# Patient Record
Sex: MALE | Race: WHITE | NOT HISPANIC OR LATINO | Employment: UNEMPLOYED | URBAN - METROPOLITAN AREA
[De-identification: names, ages, dates, MRNs, and addresses within clinical notes are randomized per-mention and may not be internally consistent; named-entity substitution may affect disease eponyms.]

---

## 2017-02-24 ENCOUNTER — CLINICAL SUPPORT (OUTPATIENT)
Dept: REHABILITATION | Facility: HOSPITAL | Age: 47
End: 2017-02-24
Attending: GENERAL PRACTICE
Payer: MEDICAID

## 2017-02-24 DIAGNOSIS — G89.29 CHRONIC MIDLINE LOW BACK PAIN WITHOUT SCIATICA: ICD-10-CM

## 2017-02-24 DIAGNOSIS — R29.3 POOR POSTURE: ICD-10-CM

## 2017-02-24 DIAGNOSIS — M62.89 MUSCLE TIGHTNESS: ICD-10-CM

## 2017-02-24 DIAGNOSIS — M54.50 CHRONIC MIDLINE LOW BACK PAIN WITHOUT SCIATICA: ICD-10-CM

## 2017-02-24 DIAGNOSIS — M62.81 WEAKNESS OF TRUNK MUSCULATURE: ICD-10-CM

## 2017-02-24 PROCEDURE — 97162 PT EVAL MOD COMPLEX 30 MIN: CPT | Mod: PN
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
## 2017-03-03 ENCOUNTER — CLINICAL SUPPORT (OUTPATIENT)
Dept: REHABILITATION | Facility: HOSPITAL | Age: 47
End: 2017-03-03
Attending: GENERAL PRACTICE
Payer: MEDICAID

## 2017-03-03 DIAGNOSIS — M62.89 MUSCLE TIGHTNESS: ICD-10-CM

## 2017-03-03 DIAGNOSIS — M54.50 CHRONIC MIDLINE LOW BACK PAIN WITHOUT SCIATICA: Primary | ICD-10-CM

## 2017-03-03 DIAGNOSIS — G89.29 CHRONIC MIDLINE LOW BACK PAIN WITHOUT SCIATICA: Primary | ICD-10-CM

## 2017-03-03 DIAGNOSIS — M62.81 WEAKNESS OF TRUNK MUSCULATURE: ICD-10-CM

## 2017-03-03 DIAGNOSIS — R29.3 POOR POSTURE: ICD-10-CM

## 2017-03-03 PROCEDURE — 97110 THERAPEUTIC EXERCISES: CPT | Mod: PN

## 2017-03-03 NOTE — PROGRESS NOTES
Name: Derian Allred [facility] Number: [id number]  Date of Treatment: 03/03/2017   Diagnosis:   Encounter Diagnoses   Name Primary?    Chronic midline low back pain without sciatica Yes    Weakness of trunk musculature     Muscle tightness     Poor posture        Time in: 0800  Time Out: 0855  Total Treatment Time: 55'        Subjective:    Derian reports improvement of symptoms.  Patient reports their pain to be 0/10 on a 0-10 scale with 0 being no pain and 10 being the worst pain imaginable. Pt cc is stiffness secondary to the weather change.     Objective    Patient received individual therapy to increase strength, endurance, ROM, flexibility, posture and core stabilization with activities as follows:     Derian received therapeutic exercises to develop strength, endurance, ROM, flexibility, posture and core stabilization for 55 minutes including:   NuStep x10 minutes warm up  DKTC x3 minutes   LTR x3 minutes  HL bridging 2x10  HL PPT 2x10 5 second holds  HL hip adduction ball squeeze 3x10 3 second holds  SL clamshells 2x10 ea.   Supine piriformis stretch 2x3 seconds ea. LE  Supine hamstring stretch 2x30 seconds ea. LE  Supine hip flexor stretch 2x30 seconds ea. LE  Standing mini squats 2x10  Standing hip abduction 2x10 ea. LE    Written Home Exercises Provided: reviewed from IE  Pt demo good understanding of the education provided. Derian demonstrated fair return demonstration of activities.     Assessment:   Derian tolerated treatment session well this morning. Pt requires cueing for correct performance of all exercises with fair correction. Pt with heavy anterior weight shift with mini squats and required continues cueing to drive weight through heels to active appropriate gluteal musculature. Plan to continue with core strengthening and improving flexibility in the following sessions as tolerated.   Pt will continue to benefit from skilled PT intervention. Medical Necessity is demonstrated by:   Pain limits function of effected part for some activities, Unable to participate fully in daily activities, Requires skilled supervision to complete and progress HEP and Weakness.    Patient is making fair progress towards established goals.    Short Term Goals (4 Weeks):   1. Pt will report 20% reduced pain within the lumbar spine for ease with walking   2. Pt will demonstrate 1/3 improvement MMT in BLE   4. Pt will demonstrate static standing balance on BLE for 30 seconds without obvious instability or use of UE assistance  5. Pt will demonstrate improved lumbar ROM by 25% in all directions for ease with picking an object up from the floor  Long Term Goals (8 Weeks):   1.Pt will report <5/10 pain within the lumbar spine for ease with ADL's  2. Pt will demonstrate 50% improvement of hamstring and hip flexor length in BLE for ease with ambulation  3. Pt will be independent with HEP for maintenance of improvements gained in therapy sessions   4. Pt will demonstrate 4+/5 strength or greater in BLE for ease with running errands            Plan      Certification Period: 2/24/17 to 5/24/17  Recommended Treatment Plan: 1-2 times per week for 8 weeks: Gait Training, Manual Therapy, Moist Heat/ Ice, Neuromuscular Re-ed, Patient Education and Therapeutic Exercise  Other Recommendations: none    Continue with established Plan of Care towards PT goals.

## 2017-03-10 ENCOUNTER — CLINICAL SUPPORT (OUTPATIENT)
Dept: REHABILITATION | Facility: HOSPITAL | Age: 47
End: 2017-03-10
Attending: GENERAL PRACTICE
Payer: MEDICAID

## 2017-03-10 DIAGNOSIS — G89.29 CHRONIC MIDLINE LOW BACK PAIN WITHOUT SCIATICA: Primary | ICD-10-CM

## 2017-03-10 DIAGNOSIS — M47.9 SPONDYLOSIS OF UNSPECIFIED SITE WITHOUT MENTION OF MYELOPATHY: ICD-10-CM

## 2017-03-10 DIAGNOSIS — M62.81 WEAKNESS OF TRUNK MUSCULATURE: ICD-10-CM

## 2017-03-10 DIAGNOSIS — M54.50 CHRONIC MIDLINE LOW BACK PAIN WITHOUT SCIATICA: Primary | ICD-10-CM

## 2017-03-10 DIAGNOSIS — R29.3 POOR POSTURE: ICD-10-CM

## 2017-03-10 DIAGNOSIS — M62.89 MUSCLE TIGHTNESS: ICD-10-CM

## 2017-03-10 PROCEDURE — 97110 THERAPEUTIC EXERCISES: CPT | Mod: PN

## 2017-03-10 NOTE — PROGRESS NOTES
Name: Derian Allred [facility] Number: [id number]  Date of Treatment: 03/10/2017   Diagnosis:   Encounter Diagnoses   Name Primary?    Chronic midline low back pain without sciatica Yes    Weakness of trunk musculature     Muscle tightness     Poor posture     Spondylosis of unspecified site without mention of myelopathy        Time in: 0930  Time Out: 1020  Total Treatment Time:50''        Subjective:    Derian reports improvement of symptoms.  Patient reports their pain to be 0/10 on a 0-10 scale with 0 being no pain and 10 being the worst pain imaginable. Pt cc is stiffness in the lower back.     Objective    Patient received individual therapy to increase strength, endurance, ROM, flexibility, posture and core stabilization with activities as follows:     Derian received therapeutic exercises to develop strength, endurance, ROM, flexibility, posture and core stabilization for 55 minutes including:   NuStep x10 minutes warm up  DKTC x3 minutes   LTR x3 minutes  HL bridging 3x10  HL PPT 2x10 5 second holds  HL TrA 3 second holds x3 minutes   HL hip adduction ball squeeze 3x10 3 second holds  SL clamshells 2x10 ea.   Supine piriformis stretch 2x30 seconds ea. LE  Supine hamstring stretch 2x30 seconds ea. LE  Supine hip flexor stretch 2x30 seconds ea. LE  Standing mini squats 2x10  Standing hip abduction 2x10 ea. LE    Written Home Exercises Provided: reviewed from IE  Pt demo good understanding of the education provided. Derian demonstrated fair return demonstration of activities.     Assessment:   Derian tolerated treatment session well this morning. Pt requires cueing for correct performance of all exercises with fair correction. With heavy rectus abdominus activation, requiring heavy tactile and verbal cueing for appropriate transverse abdominus activation.  Plan to continue with core strengthening and improving flexibility in the following sessions as tolerated.   Pt will continue to benefit from  skilled PT intervention. Medical Necessity is demonstrated by:  Pain limits function of effected part for some activities, Unable to participate fully in daily activities, Requires skilled supervision to complete and progress HEP and Weakness.    Patient is making fair progress towards established goals.    Short Term Goals (4 Weeks):   1. Pt will report 20% reduced pain within the lumbar spine for ease with walking   2. Pt will demonstrate 1/3 improvement MMT in BLE   4. Pt will demonstrate static standing balance on BLE for 30 seconds without obvious instability or use of UE assistance  5. Pt will demonstrate improved lumbar ROM by 25% in all directions for ease with picking an object up from the floor  Long Term Goals (8 Weeks):   1.Pt will report <5/10 pain within the lumbar spine for ease with ADL's  2. Pt will demonstrate 50% improvement of hamstring and hip flexor length in BLE for ease with ambulation  3. Pt will be independent with HEP for maintenance of improvements gained in therapy sessions   4. Pt will demonstrate 4+/5 strength or greater in BLE for ease with running errands            Plan      Certification Period: 2/24/17 to 5/24/17  Recommended Treatment Plan: 1-2 times per week for 8 weeks: Gait Training, Manual Therapy, Moist Heat/ Ice, Neuromuscular Re-ed, Patient Education and Therapeutic Exercise  Other Recommendations: none    Continue with established Plan of Care towards PT goals.

## 2017-03-30 ENCOUNTER — CLINICAL SUPPORT (OUTPATIENT)
Dept: REHABILITATION | Facility: HOSPITAL | Age: 47
End: 2017-03-30
Attending: GENERAL PRACTICE
Payer: MEDICAID

## 2017-03-30 DIAGNOSIS — R29.3 POOR POSTURE: ICD-10-CM

## 2017-03-30 DIAGNOSIS — M62.81 WEAKNESS OF TRUNK MUSCULATURE: Primary | ICD-10-CM

## 2017-03-30 DIAGNOSIS — M62.89 MUSCLE TIGHTNESS: ICD-10-CM

## 2017-03-30 DIAGNOSIS — M54.50 CHRONIC MIDLINE LOW BACK PAIN WITHOUT SCIATICA: ICD-10-CM

## 2017-03-30 DIAGNOSIS — G89.29 CHRONIC MIDLINE LOW BACK PAIN WITHOUT SCIATICA: ICD-10-CM

## 2017-03-30 PROCEDURE — 97110 THERAPEUTIC EXERCISES: CPT | Mod: PN

## 2017-03-30 NOTE — PROGRESS NOTES
Name: Derian Allred [facility] Number: [id number]  Date of Treatment: 03/30/2017   Diagnosis:   Encounter Diagnoses   Name Primary?    Weakness of trunk musculature Yes    Chronic midline low back pain without sciatica     Muscle tightness     Poor posture        Time in: 1000  Time Out: 1055  Total Treatment Time: 55'        Subjective:    Derian reports improvement of symptoms with cc of stiffness.  Patient reports their pain to be 0/10 on a 0-10 scale with 0 being no pain and 10 being the worst pain imaginable. Pt cc is stiffness in the lower back.     Objective    Patient received individual therapy to increase strength, endurance, ROM, flexibility, posture and core stabilization with activities as follows:     Derian received therapeutic exercises to develop strength, endurance, ROM, flexibility, posture and core stabilization for 55 minutes including:   NuStep x10 minutes warm up  DKTC x3 minutes   LTR x3 minutes  HL bridging 3x10  HL PPT x3 minutes 5 second holds  HL TrA 3 second holds x3 minutes   HL hip adduction ball squeeze 3x10 3 second holds  SL hip abduction 2x10  Supine piriformis stretch 2x30 seconds ea. LE  Supine hamstring stretch 2x30 seconds ea. LE  Supine hip flexor stretch 2x30 seconds ea. LE  Standing mini squats 2x10  Standing hip abduction 2x10 ea. LE  Shuttle leg press    Written Home Exercises Provided: reviewed from IE  Pt demo good understanding of the education provided. Derian demonstrated fair return demonstration of activities.     Assessment:   Derian tolerated treatment session well this morning. Pt requires cueing for correct performance of all exercises with fair correction. Pt with provocation of muscle cramps in the RLE throughout treatment session requiring multiple rest breaks to allow for rest and stretching. Will perform re-assessment at next treatment session.  Plan to continue with core strengthening and improving flexibility in the following sessions as  tolerated.   Pt will continue to benefit from skilled PT intervention. Medical Necessity is demonstrated by:  Pain limits function of effected part for some activities, Unable to participate fully in daily activities, Requires skilled supervision to complete and progress HEP and Weakness.    Patient is making fair progress towards established goals.    Short Term Goals (4 Weeks):   1. Pt will report 20% reduced pain within the lumbar spine for ease with walking   2. Pt will demonstrate 1/3 improvement MMT in BLE   4. Pt will demonstrate static standing balance on BLE for 30 seconds without obvious instability or use of UE assistance  5. Pt will demonstrate improved lumbar ROM by 25% in all directions for ease with picking an object up from the floor  Long Term Goals (8 Weeks):   1.Pt will report <5/10 pain within the lumbar spine for ease with ADL's  2. Pt will demonstrate 50% improvement of hamstring and hip flexor length in BLE for ease with ambulation  3. Pt will be independent with HEP for maintenance of improvements gained in therapy sessions   4. Pt will demonstrate 4+/5 strength or greater in BLE for ease with running errands            Plan      Certification Period: 2/24/17 to 5/24/17  Recommended Treatment Plan: 1-2 times per week for 8 weeks: Gait Training, Manual Therapy, Moist Heat/ Ice, Neuromuscular Re-ed, Patient Education and Therapeutic Exercise  Other Recommendations: none    Continue with established Plan of Care towards PT goals.

## 2017-04-18 ENCOUNTER — CLINICAL SUPPORT (OUTPATIENT)
Dept: REHABILITATION | Facility: HOSPITAL | Age: 47
End: 2017-04-18
Attending: GENERAL PRACTICE
Payer: MEDICAID

## 2017-04-18 DIAGNOSIS — G89.29 CHRONIC MIDLINE LOW BACK PAIN WITHOUT SCIATICA: ICD-10-CM

## 2017-04-18 DIAGNOSIS — M62.81 WEAKNESS OF TRUNK MUSCULATURE: Primary | ICD-10-CM

## 2017-04-18 DIAGNOSIS — R29.3 POOR POSTURE: ICD-10-CM

## 2017-04-18 DIAGNOSIS — M62.89 MUSCLE TIGHTNESS: ICD-10-CM

## 2017-04-18 DIAGNOSIS — M54.50 CHRONIC MIDLINE LOW BACK PAIN WITHOUT SCIATICA: ICD-10-CM

## 2017-04-18 PROCEDURE — 97110 THERAPEUTIC EXERCISES: CPT | Mod: PN

## 2017-04-18 NOTE — PROGRESS NOTES
Name: Derian Allred [facility] Number: [id number]  Date of Treatment: 04/18/2017   Diagnosis:   Encounter Diagnoses   Name Primary?    Weakness of trunk musculature Yes    Chronic midline low back pain without sciatica     Muscle tightness     Poor posture        Time in: 0807  Time Out: 0900  Total Treatment Time: 53'        Subjective:    Derian reports improvement of symptoms without reports of pain.  Patient reports their pain to be 0/10 on a 0-10 scale with 0 being no pain and 10 being the worst pain imaginable. Pt states he had a little bit of pain by the end of the day yesterday after he had been walking a lot.     Objective    AROM:    FB : fingertips to 6 inches above floor with stretching in lower back   BB: 50% limitation with increased pain in lower back    SBR: fingertips to tibiofemoral joint line with tightness    SBL : fingertips to tibiofemoral joint line with tightness    RotL : 25% limitation without increased pain    Rot R: 25% limitation without increased pain    STR/myotome:   R  L   hip flex    4-  4    knee ext    4-  4-    ankle DF    4-  4-    great toe ext    4  4    ankle PF    4-  4-      knee flex   3+  3+  FOTO: 71% limitation    Patient received individual therapy to increase strength, endurance, ROM, flexibility, posture and core stabilization with activities as follows:     Derian received therapeutic exercises to develop strength, endurance, ROM, flexibility, posture and core stabilization for 53 minutes including:   NuStep x10 minutes hills program level 1  DKTC x3 minutes   LTR x3 minutes  HL bridging 3x10  HL PPT x3 minutes 5 second holds  HL TrA 3 second holds x3 minutes   SL hip adduction 2x10 ea  SL hip abduction 2x10  Sciatic nerve glide (supine   Supine piriformis stretch 2x30 seconds ea. LE  Supine hamstring stretch 2x30 seconds ea. LE  Supine hip flexor stretch 2x30 seconds ea. LE  Standing mini squats 2x10  Standing hip abduction 2x10 ea. LE  Shuttle leg  press    Written Home Exercises Provided: reviewed from IE  Pt demo good understanding of the education provided. Derian demonstrated fair return demonstration of activities.     Assessment:   Derian tolerated treatment session well this morning. Pt requires cueing for correct performance of all exercises with fair correction. Pt continues with provocation of paresthesia within the RLE after 20 minutes of exercise. Monthly assessment performed today. Pt with mild improvement in lumbar spine ROM with decreased provocation of pain. Pt continues with weakness throughout BLE (RLE greater limitation than L). Pt was instructed to schedule an appointment with referring Md for further evaluation of the RLE, as RLE has not changed in symptomology from beginning of skilled care. Pt has achieved majority of his short term goals, with the exception of full 1/3 MMT improvement in BLE, likely secondary to fair attendance to therapy sessions.    Plan to continue with core strengthening and improving flexibility in the following sessions as tolerated.   Pt will continue to benefit from skilled PT intervention. Medical Necessity is demonstrated by:  Pain limits function of effected part for some activities, Unable to participate fully in daily activities, Requires skilled supervision to complete and progress HEP and Weakness.    Patient is making fair progress towards established goals.    Short Term Goals (4 Weeks):   1. Pt will report 20% reduced pain within the lumbar spine for ease with walking  met  2. Pt will demonstrate 1/3 improvement MMT in BLE  In progress  4. Pt will demonstrate static standing balance on BLE for 30 seconds without obvious instability or use of UE assistance met  5. Pt will demonstrate improved lumbar ROM by 25% in all directions for ease with picking an object up from the floor in progress  Long Term Goals (8 Weeks):   1.Pt will report <5/10 pain within the lumbar spine for ease with ADL's  2. Pt will  demonstrate 50% improvement of hamstring and hip flexor length in BLE for ease with ambulation  3. Pt will be independent with HEP for maintenance of improvements gained in therapy sessions   4. Pt will demonstrate 4+/5 strength or greater in BLE for ease with running errands            Plan      Certification Period: 2/24/17 to 5/24/17  Recommended Treatment Plan: 1-2 times per week for 8 weeks: Gait Training, Manual Therapy, Moist Heat/ Ice, Neuromuscular Re-ed, Patient Education and Therapeutic Exercise  Other Recommendations: none    Continue with established Plan of Care towards PT goals.

## 2017-04-25 ENCOUNTER — DOCUMENTATION ONLY (OUTPATIENT)
Dept: REHABILITATION | Facility: HOSPITAL | Age: 47
End: 2017-04-25

## 2017-04-25 NOTE — PROGRESS NOTES
Mr. De called the clinic this morning. Pt reporting he went to see his doctor per our discussion at last treatment session. Pt MD requesting to hold off on continuation of PT until further evaluation can be completed on his RLE. Per pt and pt MD request, pt is discharged from skilled PT services.     Sade Medellin, PT  4/25/2017

## 2017-05-12 ENCOUNTER — HOSPITAL ENCOUNTER (OUTPATIENT)
Dept: RADIOLOGY | Facility: HOSPITAL | Age: 47
Discharge: HOME | End: 2017-05-12
Attending: GENERAL PRACTICE
Payer: MEDICAID

## 2017-05-12 DIAGNOSIS — M23.91 DERANGEMENT OF RIGHT KNEE: ICD-10-CM

## 2017-05-12 PROCEDURE — 73721 MRI JNT OF LWR EXTRE W/O DYE: CPT | Mod: 26,RT,, | Performed by: RADIOLOGY

## 2017-05-12 PROCEDURE — 73721 MRI JNT OF LWR EXTRE W/O DYE: CPT | Mod: TC,RT

## 2018-03-28 DIAGNOSIS — M70.21 OLECRANON BURSITIS, RIGHT ELBOW: Primary | ICD-10-CM

## 2020-01-27 ENCOUNTER — HOSPITAL ENCOUNTER (EMERGENCY)
Facility: HOSPITAL | Age: 50
Discharge: HOME | End: 2020-01-27
Attending: EMERGENCY MEDICINE
Payer: MEDICAID

## 2020-01-27 VITALS
SYSTOLIC BLOOD PRESSURE: 148 MMHG | TEMPERATURE: 98 F | BODY MASS INDEX: 28.95 KG/M2 | RESPIRATION RATE: 16 BRPM | WEIGHT: 191 LBS | HEIGHT: 68 IN | HEART RATE: 88 BPM | OXYGEN SATURATION: 100 % | DIASTOLIC BLOOD PRESSURE: 66 MMHG

## 2020-01-27 DIAGNOSIS — R07.9 CHEST PAIN: ICD-10-CM

## 2020-01-27 DIAGNOSIS — R03.0 ELEVATED BLOOD PRESSURE READING WITHOUT DIAGNOSIS OF HYPERTENSION: ICD-10-CM

## 2020-01-27 DIAGNOSIS — R07.89 ATYPICAL CHEST PAIN: Primary | ICD-10-CM

## 2020-01-27 LAB
ALBUMIN SERPL BCP-MCNC: 4 G/DL (ref 3.5–5.2)
ALP SERPL-CCNC: 108 U/L (ref 55–135)
ALT SERPL W/O P-5'-P-CCNC: 51 U/L (ref 10–44)
AMPHET+METHAMPHET UR QL: NEGATIVE
ANION GAP SERPL CALC-SCNC: 8 MMOL/L (ref 8–16)
AST SERPL-CCNC: 27 U/L (ref 10–40)
BARBITURATES UR QL SCN>200 NG/ML: NEGATIVE
BASOPHILS # BLD AUTO: 0.03 K/UL (ref 0–0.2)
BASOPHILS NFR BLD: 0.6 % (ref 0–1.9)
BENZODIAZ UR QL SCN>200 NG/ML: NEGATIVE
BILIRUB SERPL-MCNC: 0.3 MG/DL (ref 0.1–1)
BUN SERPL-MCNC: 15 MG/DL (ref 6–20)
BZE UR QL SCN: NEGATIVE
CALCIUM SERPL-MCNC: 9.4 MG/DL (ref 8.7–10.5)
CANNABINOIDS UR QL SCN: NORMAL
CHLORIDE SERPL-SCNC: 103 MMOL/L (ref 95–110)
CO2 SERPL-SCNC: 27 MMOL/L (ref 23–29)
CREAT SERPL-MCNC: 1.2 MG/DL (ref 0.5–1.4)
CREAT UR-MCNC: 94 MG/DL (ref 23–375)
DIFFERENTIAL METHOD: ABNORMAL
EOSINOPHIL # BLD AUTO: 0.1 K/UL (ref 0–0.5)
EOSINOPHIL NFR BLD: 1.5 % (ref 0–8)
ERYTHROCYTE [DISTWIDTH] IN BLOOD BY AUTOMATED COUNT: 12.6 % (ref 11.5–14.5)
EST. GFR  (AFRICAN AMERICAN): >60 ML/MIN/1.73 M^2
EST. GFR  (NON AFRICAN AMERICAN): >60 ML/MIN/1.73 M^2
ETHANOL SERPL-MCNC: <10 MG/DL
GLUCOSE SERPL-MCNC: 138 MG/DL (ref 70–110)
HCT VFR BLD AUTO: 43.3 % (ref 40–54)
HGB BLD-MCNC: 14.9 G/DL (ref 14–18)
IMM GRANULOCYTES # BLD AUTO: 0.03 K/UL (ref 0–0.04)
IMM GRANULOCYTES NFR BLD AUTO: 0.6 % (ref 0–0.5)
LYMPHOCYTES # BLD AUTO: 1.4 K/UL (ref 1–4.8)
LYMPHOCYTES NFR BLD: 27.1 % (ref 18–48)
MCH RBC QN AUTO: 30.5 PG (ref 27–31)
MCHC RBC AUTO-ENTMCNC: 34.4 G/DL (ref 32–36)
MCV RBC AUTO: 89 FL (ref 82–98)
METHADONE UR QL SCN>300 NG/ML: NEGATIVE
MONOCYTES # BLD AUTO: 0.4 K/UL (ref 0.3–1)
MONOCYTES NFR BLD: 6.8 % (ref 4–15)
NEUTROPHILS # BLD AUTO: 3.3 K/UL (ref 1.8–7.7)
NEUTROPHILS NFR BLD: 63.4 % (ref 38–73)
NRBC BLD-RTO: 0 /100 WBC
OPIATES UR QL SCN: NEGATIVE
PCP UR QL SCN>25 NG/ML: NEGATIVE
PLATELET # BLD AUTO: 169 K/UL (ref 150–350)
PMV BLD AUTO: 10.6 FL (ref 9.2–12.9)
POTASSIUM SERPL-SCNC: 4.1 MMOL/L (ref 3.5–5.1)
PROT SERPL-MCNC: 7.3 G/DL (ref 6–8.4)
RBC # BLD AUTO: 4.89 M/UL (ref 4.6–6.2)
SODIUM SERPL-SCNC: 138 MMOL/L (ref 136–145)
TOXICOLOGY INFORMATION: NORMAL
TROPONIN I SERPL DL<=0.01 NG/ML-MCNC: 0.01 NG/ML (ref 0–0.03)
WBC # BLD AUTO: 5.27 K/UL (ref 3.9–12.7)

## 2020-01-27 PROCEDURE — 93010 EKG 12-LEAD: ICD-10-PCS | Mod: ,,, | Performed by: INTERNAL MEDICINE

## 2020-01-27 PROCEDURE — 93010 ELECTROCARDIOGRAM REPORT: CPT | Mod: ,,, | Performed by: INTERNAL MEDICINE

## 2020-01-27 PROCEDURE — 80307 DRUG TEST PRSMV CHEM ANLYZR: CPT

## 2020-01-27 PROCEDURE — 94640 AIRWAY INHALATION TREATMENT: CPT

## 2020-01-27 PROCEDURE — 93005 ELECTROCARDIOGRAM TRACING: CPT

## 2020-01-27 PROCEDURE — 84484 ASSAY OF TROPONIN QUANT: CPT

## 2020-01-27 PROCEDURE — 63600175 PHARM REV CODE 636 W HCPCS: Performed by: EMERGENCY MEDICINE

## 2020-01-27 PROCEDURE — 25000242 PHARM REV CODE 250 ALT 637 W/ HCPCS: Performed by: EMERGENCY MEDICINE

## 2020-01-27 PROCEDURE — 85025 COMPLETE CBC W/AUTO DIFF WBC: CPT

## 2020-01-27 PROCEDURE — 80320 DRUG SCREEN QUANTALCOHOLS: CPT

## 2020-01-27 PROCEDURE — 80053 COMPREHEN METABOLIC PANEL: CPT

## 2020-01-27 PROCEDURE — 25000003 PHARM REV CODE 250: Performed by: EMERGENCY MEDICINE

## 2020-01-27 PROCEDURE — 99285 EMERGENCY DEPT VISIT HI MDM: CPT | Mod: 25

## 2020-01-27 RX ORDER — IPRATROPIUM BROMIDE AND ALBUTEROL SULFATE 2.5; .5 MG/3ML; MG/3ML
3 SOLUTION RESPIRATORY (INHALATION)
Status: DISPENSED | OUTPATIENT
Start: 2020-01-27 | End: 2020-01-27

## 2020-01-27 RX ORDER — FAMOTIDINE 20 MG/1
20 TABLET, FILM COATED ORAL 2 TIMES DAILY
Qty: 60 TABLET | Refills: 0 | Status: SHIPPED | OUTPATIENT
Start: 2020-01-27 | End: 2024-03-27

## 2020-01-27 RX ORDER — DICYCLOMINE HYDROCHLORIDE 20 MG/1
20 TABLET ORAL 2 TIMES DAILY
Qty: 30 TABLET | Refills: 0 | Status: SHIPPED | OUTPATIENT
Start: 2020-01-27 | End: 2020-02-26

## 2020-01-27 RX ORDER — NITROGLYCERIN 0.4 MG/1
0.4 TABLET SUBLINGUAL
Status: COMPLETED | OUTPATIENT
Start: 2020-01-27 | End: 2020-01-27

## 2020-01-27 RX ORDER — ASPIRIN 325 MG
325 TABLET, DELAYED RELEASE (ENTERIC COATED) ORAL DAILY
Status: DISCONTINUED | OUTPATIENT
Start: 2020-01-27 | End: 2020-01-27 | Stop reason: HOSPADM

## 2020-01-27 RX ORDER — SIMETHICONE 125 MG
125 CAPSULE ORAL 4 TIMES DAILY PRN
Qty: 30 CAPSULE | Refills: 0 | Status: SHIPPED | OUTPATIENT
Start: 2020-01-27

## 2020-01-27 RX ADMIN — ASPIRIN 325 MG: 325 TABLET, DELAYED RELEASE ORAL at 01:01

## 2020-01-27 RX ADMIN — IPRATROPIUM BROMIDE AND ALBUTEROL SULFATE 3 ML: .5; 3 SOLUTION RESPIRATORY (INHALATION) at 01:01

## 2020-01-27 RX ADMIN — NITROGLYCERIN 0.4 MG: 0.4 TABLET SUBLINGUAL at 01:01

## 2020-01-27 NOTE — ED PROVIDER NOTES
Encounter Date: 1/27/2020       History     Chief Complaint   Patient presents with    Chest Pain     here via EMS with c/o chest pain, worsened with movements and coughing. Denies any cardiac hx, reports hx of anxiety.     49 y.o. male Past Medical History:  No date: Anxiety  No date: Depression  No date: Emphysema of lung     Presents c/o chest tightness starting at 7am. Notes it radiates down L arm. Denies f/c, n/v, diarrhea/dysuria/cough.  States it does not feel like his anxiety.         Review of patient's allergies indicates:  No Known Allergies  Past Medical History:   Diagnosis Date    Anxiety     Depression     Emphysema of lung      No past surgical history on file.  No family history on file.  Social History     Tobacco Use    Smoking status: Current Every Day Smoker     Packs/day: 1.00     Types: Cigarettes   Substance Use Topics    Alcohol use: Not on file    Drug use: Not on file     Review of Systems   Constitutional: Negative for fever.   HENT: Negative for sore throat.    Respiratory: Negative for shortness of breath.    Cardiovascular: Positive for chest pain.   Gastrointestinal: Negative for nausea.   Genitourinary: Negative for dysuria.   Musculoskeletal: Negative for back pain.   Skin: Negative for rash.   Neurological: Negative for weakness.   Hematological: Does not bruise/bleed easily.   All other systems reviewed and are negative.      Physical Exam     Initial Vitals [01/27/20 1254]   BP Pulse Resp Temp SpO2   (!) 151/68 90 20 98.3 °F (36.8 °C) 100 %      MAP       --         Physical Exam    Nursing note and vitals reviewed.  Constitutional: He appears well-developed and well-nourished.   HENT:   Head: Normocephalic and atraumatic.   Eyes: EOM are normal. Pupils are equal, round, and reactive to light.   Cardiovascular: Normal rate, regular rhythm and normal pulses.   Pulmonary/Chest: Effort normal and breath sounds normal.   Abdominal: He exhibits no distension.   Musculoskeletal:  He exhibits no edema or tenderness.   Neurological: He is alert and oriented to person, place, and time. No cranial nerve deficit.   Skin: Skin is warm and dry.   Psychiatric: He has a normal mood and affect.         ED Course   Procedures  Labs Reviewed   CBC W/ AUTO DIFFERENTIAL   COMPREHENSIVE METABOLIC PANEL   TROPONIN I     EKG Readings: (Independently Interpreted)   Hr 88, sinus, nl axis/intervals, no phil/twi, non acute, no stemi.       Imaging Results    None             Additional MDM:   Heart Score:    History:          Slightly suspicious.  ECG:             Normal  Age:               45-65 years  Risk factors: no risk factors known  Troponin:       Less than or equal to normal limit  Final Score: 1                       Pt has no risk factors. Will do 1 set of c.e. Given onset 7am.     Labs Reviewed   CBC W/ AUTO DIFFERENTIAL - Abnormal; Notable for the following components:       Result Value    Immature Granulocytes 0.6 (*)     All other components within normal limits   COMPREHENSIVE METABOLIC PANEL - Abnormal; Notable for the following components:    Glucose 138 (*)     ALT 51 (*)     All other components within normal limits   TROPONIN I   ALCOHOL,MEDICAL (ETHANOL)   DRUG SCREEN PANEL, URINE EMERGENCY       X-Ray Chest AP Portable   Final Result      Please see above         Electronically signed by: Yury Campbell DO   Date:    01/27/2020   Time:    13:27              Clinical Impression:       ICD-10-CM ICD-9-CM   1. Atypical chest pain R07.89 786.59   2. Chest pain R07.9 786.50   3. Elevated blood pressure reading without diagnosis of hypertension R03.0 796.2                             Ellyn Cedeno MD  01/27/20 0466

## 2020-01-27 NOTE — DISCHARGE INSTRUCTIONS
Thank you for coming to our Emergency Department today. It is important to remember that some problems are difficult to diagnose and may not be found during your first visit. Be sure to follow up with your primary care doctor and review any labs/imaging that was performed with them. If you do not have a primary care doctor, you may contact the one listed on your discharge paperwork or you may also call the [facility] Appointment Desk at [phone] to schedule an appointment with one.     All medications may potentially have side effects and it is impossible to predict which medications may give you side effects. If you feel that you are having a negative effect of any medication you should immediately stop taking them and seek medical attention.    Return to the ER with any questions/concerns, new/concerning symptoms, worsening or failure to improve. Do not drive or make any important decisions for 24 hours if you have received any pain medications, sedatives or mood altering drugs during your ER visit.

## 2020-01-27 NOTE — ED TRIAGE NOTES
Pt presents to ED from home with c/o right and left CP that started at 0730 today. Pt reports pain 8/10 (pressure) that radiates down left arm. Pt also complains of SOB and left arm tingling. Pt denies taking any medication for pain or aspirin/cardiac before arrival. Pt A&O x4.

## 2021-04-15 ENCOUNTER — PATIENT MESSAGE (OUTPATIENT)
Dept: RESEARCH | Facility: HOSPITAL | Age: 51
End: 2021-04-15

## 2021-07-01 ENCOUNTER — PATIENT MESSAGE (OUTPATIENT)
Dept: ADMINISTRATIVE | Facility: OTHER | Age: 51
End: 2021-07-01

## 2021-10-05 ENCOUNTER — HOSPITAL ENCOUNTER (EMERGENCY)
Facility: HOSPITAL | Age: 51
Discharge: HOME | End: 2021-10-05
Attending: EMERGENCY MEDICINE
Payer: MEDICAID

## 2021-10-05 VITALS
HEIGHT: 66 IN | SYSTOLIC BLOOD PRESSURE: 127 MMHG | TEMPERATURE: 98 F | HEART RATE: 65 BPM | RESPIRATION RATE: 14 BRPM | WEIGHT: 190 LBS | DIASTOLIC BLOOD PRESSURE: 77 MMHG | OXYGEN SATURATION: 99 % | BODY MASS INDEX: 30.53 KG/M2

## 2021-10-05 DIAGNOSIS — G56.32 RADIAL NERVE PALSY, LEFT: Primary | ICD-10-CM

## 2021-10-05 DIAGNOSIS — M21.339: ICD-10-CM

## 2021-10-05 LAB — POCT GLUCOSE: 108 MG/DL (ref 70–110)

## 2021-10-05 PROCEDURE — 99284 EMERGENCY DEPT VISIT MOD MDM: CPT | Mod: 25

## 2021-10-05 PROCEDURE — 82962 GLUCOSE BLOOD TEST: CPT

## 2021-10-05 RX ORDER — IBUPROFEN 800 MG/1
800 TABLET ORAL EVERY 6 HOURS PRN
Qty: 20 TABLET | Refills: 0 | Status: SHIPPED | OUTPATIENT
Start: 2021-10-05

## 2021-10-28 ENCOUNTER — TELEPHONE (OUTPATIENT)
Dept: NEUROLOGY | Facility: CLINIC | Age: 51
End: 2021-10-28
Payer: MEDICAID

## 2021-10-28 DIAGNOSIS — G56.32 LESION OF LEFT RADIAL NERVE: Primary | ICD-10-CM

## 2021-12-03 ENCOUNTER — PROCEDURE VISIT (OUTPATIENT)
Dept: NEUROLOGY | Facility: CLINIC | Age: 51
End: 2021-12-03
Payer: MEDICAID

## 2021-12-03 VITALS — BODY MASS INDEX: 30.54 KG/M2 | WEIGHT: 190.06 LBS | HEIGHT: 66 IN

## 2021-12-03 DIAGNOSIS — G56.32 LESION OF LEFT RADIAL NERVE: Primary | ICD-10-CM

## 2021-12-03 PROCEDURE — 95911 NRV CNDJ TEST 9-10 STUDIES: CPT | Mod: 26,S$PBB,, | Performed by: NEUROLOGICAL SURGERY

## 2021-12-03 PROCEDURE — 95886 PR EMG COMPLETE, W/ NERVE CONDUCTION STUDIES, 5+ MUSCLES: ICD-10-PCS | Mod: 26,S$PBB,, | Performed by: NEUROLOGICAL SURGERY

## 2021-12-03 PROCEDURE — 95886 MUSC TEST DONE W/N TEST COMP: CPT | Mod: 26,S$PBB,, | Performed by: NEUROLOGICAL SURGERY

## 2021-12-03 PROCEDURE — 95886 MUSC TEST DONE W/N TEST COMP: CPT | Mod: PBBFAC | Performed by: NEUROLOGICAL SURGERY

## 2021-12-03 PROCEDURE — 95911 PR NERVE CONDUCTION STUDY; 9-10 STUDIES: ICD-10-PCS | Mod: 26,S$PBB,, | Performed by: NEUROLOGICAL SURGERY

## 2021-12-03 PROCEDURE — 95911 NRV CNDJ TEST 9-10 STUDIES: CPT | Mod: PBBFAC | Performed by: NEUROLOGICAL SURGERY

## 2023-11-09 DIAGNOSIS — R55 SYNCOPE AND COLLAPSE: Primary | ICD-10-CM

## 2023-11-14 ENCOUNTER — HOSPITAL ENCOUNTER (OUTPATIENT)
Dept: RADIOLOGY | Facility: HOSPITAL | Age: 53
Discharge: HOME | End: 2023-11-14
Attending: GENERAL PRACTICE
Payer: MEDICAID

## 2023-11-14 DIAGNOSIS — R55 SYNCOPE AND COLLAPSE: ICD-10-CM

## 2023-11-14 PROCEDURE — 93880 US CAROTID BILATERAL: ICD-10-PCS | Mod: 26,,, | Performed by: RADIOLOGY

## 2023-11-14 PROCEDURE — 93880 EXTRACRANIAL BILAT STUDY: CPT | Mod: TC

## 2023-11-14 PROCEDURE — 93880 EXTRACRANIAL BILAT STUDY: CPT | Mod: 26,,, | Performed by: RADIOLOGY

## 2023-12-12 ENCOUNTER — HOSPITAL ENCOUNTER (OUTPATIENT)
Dept: CARDIOLOGY | Facility: HOSPITAL | Age: 53
Discharge: HOME | End: 2023-12-12
Attending: GENERAL PRACTICE
Payer: MEDICAID

## 2023-12-12 DIAGNOSIS — R55 SYNCOPE AND COLLAPSE: ICD-10-CM

## 2023-12-12 LAB
AV INDEX (PROSTH): 0.93
AV MEAN GRADIENT: 4 MMHG
AV PEAK GRADIENT: 5 MMHG
AV VALVE AREA BY VELOCITY RATIO: 2.55 CM²
AV VALVE AREA: 2.99 CM²
AV VELOCITY RATIO: 0.79
CV ECHO LV RWT: 0.55 CM
DOP CALC AO PEAK VEL: 1.17 M/S
DOP CALC AO VTI: 21.3 CM
DOP CALC LVOT AREA: 3.2 CM2
DOP CALC LVOT DIAMETER: 2.02 CM
DOP CALC LVOT PEAK VEL: 0.93 M/S
DOP CALC LVOT STROKE VOLUME: 63.74 CM3
DOP CALCLVOT PEAK VEL VTI: 19.9 CM
E WAVE DECELERATION TIME: 151.91 MSEC
E/A RATIO: 1.51
E/E' RATIO: 9.81 M/S
ECHO LV POSTERIOR WALL: 1.16 CM (ref 0.6–1.1)
FRACTIONAL SHORTENING: 36 % (ref 28–44)
INTERVENTRICULAR SEPTUM: 1.23 CM (ref 0.6–1.1)
IVC DIAMETER: 1.46 CM
IVRT: 88.49 MSEC
LA MAJOR: 5.21 CM
LA MINOR: 4.92 CM
LA WIDTH: 4 CM
LEFT ATRIUM SIZE: 3.98 CM
LEFT ATRIUM VOLUME: 68.48 CM3
LEFT INTERNAL DIMENSION IN SYSTOLE: 2.72 CM (ref 2.1–4)
LEFT VENTRICLE DIASTOLIC VOLUME: 80.14 ML
LEFT VENTRICLE SYSTOLIC VOLUME: 27.55 ML
LEFT VENTRICULAR INTERNAL DIMENSION IN DIASTOLE: 4.24 CM (ref 3.5–6)
LEFT VENTRICULAR MASS: 179.65 G
LV LATERAL E/E' RATIO: 9.36 M/S
LV SEPTAL E/E' RATIO: 10.3 M/S
LVOT MG: 2.07 MMHG
LVOT MV: 0.68 CM/S
MV PEAK A VEL: 0.68 M/S
MV PEAK E VEL: 1.03 M/S
MV STENOSIS PRESSURE HALF TIME: 44.05 MS
MV VALVE AREA P 1/2 METHOD: 4.99 CM2
PISA TR MAX VEL: 1.76 M/S
PULM VEIN S/D RATIO: 1.43
PV PEAK D VEL: 0.37 M/S
PV PEAK GRADIENT: 3 MMHG
PV PEAK S VEL: 0.53 M/S
PV PEAK VELOCITY: 0.92 M/S
RA MAJOR: 4.84 CM
RA PRESSURE ESTIMATED: 3 MMHG
RA WIDTH: 3.6 CM
RIGHT VENTRICULAR END-DIASTOLIC DIMENSION: 3.36 CM
RV TB RVSP: 5 MMHG
RV TISSUE DOPPLER FREE WALL SYSTOLIC VELOCITY 1 (APICAL 4 CHAMBER VIEW): 12.47 CM/S
SINUS: 3.36 CM
STJ: 1.85 CM
TDI LATERAL: 0.11 M/S
TDI SEPTAL: 0.1 M/S
TDI: 0.11 M/S
TR MAX PG: 12 MMHG
TRICUSPID ANNULAR PLANE SYSTOLIC EXCURSION: 2.04 CM
TV REST PULMONARY ARTERY PRESSURE: 15 MMHG

## 2023-12-12 PROCEDURE — 93306 TTE W/DOPPLER COMPLETE: CPT | Mod: 26,,, | Performed by: INTERNAL MEDICINE

## 2023-12-12 PROCEDURE — 93306 ECHO (CUPID ONLY): ICD-10-PCS | Mod: 26,,, | Performed by: INTERNAL MEDICINE

## 2023-12-12 PROCEDURE — 93306 TTE W/DOPPLER COMPLETE: CPT

## 2024-01-10 ENCOUNTER — OFFICE VISIT (OUTPATIENT)
Dept: CARDIOLOGY | Facility: CLINIC | Age: 54
End: 2024-01-10
Payer: MEDICAID

## 2024-01-10 VITALS
HEIGHT: 67 IN | WEIGHT: 192 LBS | HEART RATE: 69 BPM | BODY MASS INDEX: 30.13 KG/M2 | SYSTOLIC BLOOD PRESSURE: 139 MMHG | OXYGEN SATURATION: 98 % | DIASTOLIC BLOOD PRESSURE: 76 MMHG

## 2024-01-10 DIAGNOSIS — I65.22 ASYMPTOMATIC STENOSIS OF LEFT CAROTID ARTERY: ICD-10-CM

## 2024-01-10 DIAGNOSIS — R29.898 WEAKNESS OF BOTH UPPER EXTREMITIES: Primary | ICD-10-CM

## 2024-01-10 DIAGNOSIS — I65.29 OCCLUSION AND STENOSIS OF UNSPECIFIED CAROTID ARTERY: Primary | ICD-10-CM

## 2024-01-10 PROCEDURE — 3008F BODY MASS INDEX DOCD: CPT | Mod: CPTII,,, | Performed by: INTERNAL MEDICINE

## 2024-01-10 PROCEDURE — 99204 OFFICE O/P NEW MOD 45 MIN: CPT | Mod: S$PBB,,, | Performed by: INTERNAL MEDICINE

## 2024-01-10 PROCEDURE — 99213 OFFICE O/P EST LOW 20 MIN: CPT | Mod: PBBFAC | Performed by: INTERNAL MEDICINE

## 2024-01-10 PROCEDURE — 99999 PR PBB SHADOW E&M-EST. PATIENT-LVL III: CPT | Mod: PBBFAC,,, | Performed by: INTERNAL MEDICINE

## 2024-01-10 PROCEDURE — 1159F MED LIST DOCD IN RCRD: CPT | Mod: CPTII,,, | Performed by: INTERNAL MEDICINE

## 2024-01-10 PROCEDURE — 3075F SYST BP GE 130 - 139MM HG: CPT | Mod: CPTII,,, | Performed by: INTERNAL MEDICINE

## 2024-01-10 PROCEDURE — 3078F DIAST BP <80 MM HG: CPT | Mod: CPTII,,, | Performed by: INTERNAL MEDICINE

## 2024-01-10 PROCEDURE — 1160F RVW MEDS BY RX/DR IN RCRD: CPT | Mod: CPTII,,, | Performed by: INTERNAL MEDICINE

## 2024-01-10 NOTE — PROGRESS NOTES
PCP - Garrett Singh III, MD  Subjective:   Patient ID:  Derian Cartwright is a 53 y.o. male who presents for  evaluation of carotid stenosis     HPI: 54 yo male. Presented with complaint of falling and weakness in his upper extremities. He reports sudden loss in strength in lower extremities and falling, usually not accompanied with dizziness or loss of conscious, also he reports feeling dizzy and falling when he looks up, reporting dropping things from his hands and weakness in the hands with pain   US reported severe L ICA stenosis and moderate R ICA stenosis     History:     Past Medical History:   Diagnosis Date    Anxiety     Depression     Emphysema of lung     Peripheral neuropathy      History reviewed. No pertinent surgical history.  Social History     Tobacco Use    Smoking status: Some Days     Current packs/day: 0.50     Types: Cigarettes    Smokeless tobacco: Not on file    Tobacco comments:     jewel pods   Substance Use Topics    Alcohol use: Not Currently     History reviewed. No pertinent family history.    Meds:   Review of patient's allergies indicates:  No Known Allergies    Current Outpatient Medications:     atorvastatin calcium (ATORVASTATIN ORAL), Take by mouth., Disp: , Rfl:     famotidine (PEPCID) 20 MG tablet, Take 1 tablet (20 mg total) by mouth 2 (two) times daily., Disp: 60 tablet, Rfl: 0    ibuprofen (ADVIL,MOTRIN) 800 MG tablet, Take 1 tablet (800 mg total) by mouth every 6 (six) hours as needed for Pain., Disp: 20 tablet, Rfl: 0    pregabalin (LYRICA) 75 MG capsule, Take 75 mg by mouth 2 (two) times daily., Disp: , Rfl:     quetiapine (SEROQUEL XR) 300 MG Tb24, Take by mouth once daily., Disp: , Rfl:     sertraline (ZOLOFT) 100 MG tablet, Take 100 mg by mouth once daily., Disp: , Rfl:     simethicone (MYLICON) 125 mg Cap capsule, Take 1 capsule (125 mg total) by mouth 4 (four) times daily as needed. (Patient not taking: Reported on 1/10/2024), Disp: 30 capsule, Rfl:  "0      ROS 14 systems were reviewed, negative except above     Objective:   /76 (BP Location: Right arm, Patient Position: Sitting, BP Method: Large (Automatic))   Pulse 69   Ht 5' 7" (1.702 m)   Wt 87.1 kg (192 lb 0.3 oz)   SpO2 98%   BMI 30.07 kg/m²   Physical Exam  Gen awake and alert  Neck supple, R carotid bruits   Heat distant heart sounds, no appreciated M  Lungs CTA B  Abdomen soft non tender   Ext no edema   Neuro  strength is 4/5   Labs:     Lab Results   Component Value Date     01/27/2020    K 4.1 01/27/2020     01/27/2020    CO2 27 01/27/2020    BUN 15 01/27/2020    CREATININE 1.2 01/27/2020    ANIONGAP 8 01/27/2020     No results found for: "HGBA1C"  No results found for: "BNP", "BNPTRIAGEBLO"    Lab Results   Component Value Date    WBC 5.27 01/27/2020    HGB 14.9 01/27/2020    HCT 43.3 01/27/2020     01/27/2020    GRAN 3.3 01/27/2020    GRAN 63.4 01/27/2020     No results found for: "CHOL", "HDL", "LDLCALC", "TRIG"    Lab Results   Component Value Date     01/27/2020    K 4.1 01/27/2020     01/27/2020    CO2 27 01/27/2020    BUN 15 01/27/2020    CREATININE 1.2 01/27/2020    ANIONGAP 8 01/27/2020     No results found for: "HGBA1C"  No results found for: "BNP", "BNPTRIAGEBLO" Lab Results   Component Value Date    WBC 5.27 01/27/2020    HGB 14.9 01/27/2020    HCT 43.3 01/27/2020     01/27/2020    GRAN 3.3 01/27/2020    GRAN 63.4 01/27/2020     No results found for: "CHOL", "HDL", "LDLCALC", "TRIG"             Cardiovascular Imaging:   Carotid US     TECHNIQUE:  Grayscale and color Doppler ultrasound examination of the carotid and vertebral artery systems bilaterally.  Stenosis estimates are per the NASCET measurement criteria.     COMPARISON:  None     FINDINGS:  Right:     CCA PSV: 139-cm/sec     ICA PSV: 140-cm/sec     ICA EDV: 24-cm/sec     ECA PSV: 124-cm/sec     ICA/CCA PSV ratio: 1.0     Plaque formation: Homogeneous     Vertebral artery: " Antegrade flow with normal monophasic waveforms.     Left:     CCA PSV: 186-cm/sec     ICA PSV: 306-cm/sec     ICA EDV: 101-cm/sec     ECA PSV: 119-cm/sec     ICA/CCA PSV ratio: 1.6     Plaque formation: Homogeneous     Vertebral artery: Antegrade flow with normal monophasic waveforms.     Impression:     1. Sonogram suggest moderate (50-69%) stenosis along the proximal right ICA and, severe (70-69%) stenosis along the proximal left ICA.  This report was flagged in Epic as abnormal.    Assessment & Plan:   Weakness of both upper extremities  Concerning for cervical spine stenosis, proceed with cervical MRI and referral to spine clinic     Asymptomatic stenosis of left carotid artery  Continue Aspirin and high intensity statin   Proceed with CTA to better evaluate carotid stenosis           Signed:  Tony Stevenson MD  Interventional fellow

## 2024-01-10 NOTE — ASSESSMENT & PLAN NOTE
Continue Aspirin and high intensity statin   Proceed with CTA to better evaluate carotid stenosis

## 2024-01-11 DIAGNOSIS — Z01.818 PREOP TESTING: ICD-10-CM

## 2024-01-11 DIAGNOSIS — I65.22 ASYMPTOMATIC STENOSIS OF LEFT CAROTID ARTERY: Primary | ICD-10-CM

## 2024-01-12 ENCOUNTER — TELEPHONE (OUTPATIENT)
Dept: CARDIOLOGY | Facility: CLINIC | Age: 54
End: 2024-01-12
Payer: MEDICAID

## 2024-01-12 NOTE — TELEPHONE ENCOUNTER
Contact with patient to notify of labs needed prior to his CT scan currently booked.  Pt verbalized understanding however unsure where his appointments were.  Offered to mail appointment letters to patient, patient accepted.  Pt labs ordered and appt made for patient as well.  Mailed appointment letters to patient as requested.

## 2024-01-19 ENCOUNTER — HOSPITAL ENCOUNTER (OUTPATIENT)
Dept: RADIOLOGY | Facility: HOSPITAL | Age: 54
Discharge: HOME | End: 2024-01-19
Attending: INTERNAL MEDICINE
Payer: MEDICAID

## 2024-01-19 DIAGNOSIS — I65.29 OCCLUSION AND STENOSIS OF UNSPECIFIED CAROTID ARTERY: ICD-10-CM

## 2024-01-19 PROCEDURE — A9585 GADOBUTROL INJECTION: HCPCS | Performed by: INTERNAL MEDICINE

## 2024-01-19 PROCEDURE — 72156 MRI NECK SPINE W/O & W/DYE: CPT | Mod: TC

## 2024-01-19 PROCEDURE — 25500020 PHARM REV CODE 255: Performed by: INTERNAL MEDICINE

## 2024-01-19 PROCEDURE — 72156 MRI NECK SPINE W/O & W/DYE: CPT | Mod: 26,,, | Performed by: RADIOLOGY

## 2024-01-19 RX ORDER — GADOBUTROL 604.72 MG/ML
9 INJECTION INTRAVENOUS
Status: COMPLETED | OUTPATIENT
Start: 2024-01-19 | End: 2024-01-19

## 2024-01-19 RX ADMIN — GADOBUTROL 9 ML: 604.72 INJECTION INTRAVENOUS at 10:01

## 2024-01-24 ENCOUNTER — HOSPITAL ENCOUNTER (OUTPATIENT)
Dept: RADIOLOGY | Facility: HOSPITAL | Age: 54
Discharge: HOME | End: 2024-01-24
Attending: INTERNAL MEDICINE
Payer: MEDICAID

## 2024-01-24 DIAGNOSIS — I65.29 OCCLUSION AND STENOSIS OF UNSPECIFIED CAROTID ARTERY: ICD-10-CM

## 2024-01-24 PROCEDURE — 70496 CT ANGIOGRAPHY HEAD: CPT | Mod: 26,,, | Performed by: RADIOLOGY

## 2024-01-24 PROCEDURE — 70498 CT ANGIOGRAPHY NECK: CPT | Mod: 26,,, | Performed by: RADIOLOGY

## 2024-01-24 PROCEDURE — 25500020 PHARM REV CODE 255: Performed by: INTERNAL MEDICINE

## 2024-01-24 PROCEDURE — 70496 CT ANGIOGRAPHY HEAD: CPT | Mod: TC

## 2024-01-24 RX ADMIN — IOHEXOL 75 ML: 350 INJECTION, SOLUTION INTRAVENOUS at 04:01

## 2024-03-27 ENCOUNTER — OFFICE VISIT (OUTPATIENT)
Dept: CARDIOLOGY | Facility: CLINIC | Age: 54
End: 2024-03-27
Payer: MEDICAID

## 2024-03-27 ENCOUNTER — EDUCATION (OUTPATIENT)
Dept: CARDIOLOGY | Facility: CLINIC | Age: 54
End: 2024-03-27
Payer: MEDICAID

## 2024-03-27 VITALS
WEIGHT: 202.38 LBS | DIASTOLIC BLOOD PRESSURE: 58 MMHG | SYSTOLIC BLOOD PRESSURE: 109 MMHG | BODY MASS INDEX: 30.67 KG/M2 | HEART RATE: 83 BPM | HEIGHT: 68 IN | OXYGEN SATURATION: 98 %

## 2024-03-27 DIAGNOSIS — I65.23 BILATERAL CAROTID ARTERY STENOSIS: Primary | ICD-10-CM

## 2024-03-27 DIAGNOSIS — Z01.818 PREOP TESTING: Primary | ICD-10-CM

## 2024-03-27 DIAGNOSIS — I65.22 CAROTID STENOSIS, SYMPTOMATIC W/O INFARCT, LEFT: Primary | ICD-10-CM

## 2024-03-27 PROBLEM — I65.29 CAROTID ARTERY STENOSIS: Status: ACTIVE | Noted: 2024-01-10

## 2024-03-27 PROCEDURE — 3074F SYST BP LT 130 MM HG: CPT | Mod: CPTII,,, | Performed by: INTERNAL MEDICINE

## 2024-03-27 PROCEDURE — 99999 PR PBB SHADOW E&M-EST. PATIENT-LVL III: CPT | Mod: PBBFAC,,, | Performed by: INTERNAL MEDICINE

## 2024-03-27 PROCEDURE — 3078F DIAST BP <80 MM HG: CPT | Mod: CPTII,,, | Performed by: INTERNAL MEDICINE

## 2024-03-27 PROCEDURE — 99215 OFFICE O/P EST HI 40 MIN: CPT | Mod: S$PBB,,, | Performed by: INTERNAL MEDICINE

## 2024-03-27 PROCEDURE — 1159F MED LIST DOCD IN RCRD: CPT | Mod: CPTII,,, | Performed by: INTERNAL MEDICINE

## 2024-03-27 PROCEDURE — 3008F BODY MASS INDEX DOCD: CPT | Mod: CPTII,,, | Performed by: INTERNAL MEDICINE

## 2024-03-27 PROCEDURE — 99213 OFFICE O/P EST LOW 20 MIN: CPT | Mod: PBBFAC | Performed by: INTERNAL MEDICINE

## 2024-03-27 RX ORDER — SODIUM CHLORIDE 9 MG/ML
INJECTION, SOLUTION INTRAVENOUS CONTINUOUS
Status: CANCELLED | OUTPATIENT
Start: 2024-03-27 | End: 2024-03-27

## 2024-03-27 RX ORDER — DIPHENHYDRAMINE HCL 50 MG
50 CAPSULE ORAL ONCE
Status: CANCELLED | OUTPATIENT
Start: 2024-03-27 | End: 2024-03-27

## 2024-03-27 RX ORDER — CLOPIDOGREL BISULFATE 75 MG/1
75 TABLET ORAL DAILY
Qty: 30 TABLET | Refills: 11 | Status: SHIPPED | OUTPATIENT
Start: 2024-03-27 | End: 2025-03-27

## 2024-03-27 RX ORDER — SODIUM CHLORIDE 0.9 % (FLUSH) 0.9 %
10 SYRINGE (ML) INJECTION
Status: SHIPPED | OUTPATIENT
Start: 2024-03-27

## 2024-03-27 NOTE — PROGRESS NOTES
PCP - Garrett Singh III, MD  Subjective:   Patient ID:  Derian Cartwright is a 53 y.o. male who presents for  follow up regarding carotid stenosis     HPI: 54 yo male. Presented with complaint of falling and weakness in his upper extremities. He reports sudden loss in strength in lower extremities and falling, usually not accompanied with dizziness or loss of conscious, also he reports feeling dizzy and falling when he looks up, reporting dropping things from his hands and weakness in the hands with pain   We did a cervical spine MRI patient is following up with Spine   CTA of the head and neck showed There is a severe stenosis (greater than 90%) at the left ICA origin with resultant adaptive narrowing distally. Additional 60-70% stenosis at the right ICA origin.     History:     Past Medical History:   Diagnosis Date    Anxiety     Depression     Emphysema of lung     Peripheral neuropathy      History reviewed. No pertinent surgical history.  Social History     Tobacco Use    Smoking status: Some Days     Current packs/day: 0.50     Types: Cigarettes    Smokeless tobacco: Not on file    Tobacco comments:     jewel pods   Substance Use Topics    Alcohol use: Not Currently     History reviewed. No pertinent family history.    Meds:   Review of patient's allergies indicates:  No Known Allergies    Current Outpatient Medications:     atorvastatin calcium (ATORVASTATIN ORAL), Take by mouth., Disp: , Rfl:     famotidine (PEPCID) 20 MG tablet, Take 1 tablet (20 mg total) by mouth 2 (two) times daily., Disp: 60 tablet, Rfl: 0    ibuprofen (ADVIL,MOTRIN) 800 MG tablet, Take 1 tablet (800 mg total) by mouth every 6 (six) hours as needed for Pain., Disp: 20 tablet, Rfl: 0    pregabalin (LYRICA) 75 MG capsule, Take 75 mg by mouth 2 (two) times daily., Disp: , Rfl:     quetiapine (SEROQUEL XR) 300 MG Tb24, Take by mouth once daily., Disp: , Rfl:     sertraline (ZOLOFT) 100 MG tablet, Take 100 mg by mouth once  "daily., Disp: , Rfl:     clopidogreL (PLAVIX) 75 mg tablet, Take 1 tablet (75 mg total) by mouth once daily. Please take 4 tablets ( 300 mg ) first dose, then after that one tablet daily ( 75 mg daily ), Disp: 30 tablet, Rfl: 11    simethicone (MYLICON) 125 mg Cap capsule, Take 1 capsule (125 mg total) by mouth 4 (four) times daily as needed. (Patient not taking: Reported on 1/10/2024), Disp: 30 capsule, Rfl: 0      ROS 14 systems were reviewed, negative except above     Objective:   BP (!) 109/58 (BP Location: Right arm, Patient Position: Sitting, BP Method: Large (Automatic))   Pulse 83   Ht 5' 8" (1.727 m)   Wt 91.8 kg (202 lb 6.1 oz)   SpO2 98%   BMI 30.77 kg/m²   Physical Exam  Gen awake and alert  Neck supple, R carotid bruits   Heat distant heart sounds, no appreciated M  Lungs CTA B  Abdomen soft non tender   Ext no edema   Neuro  strength is 4/5   Labs:     Lab Results   Component Value Date     01/19/2024    K 4.5 01/19/2024     01/19/2024    CO2 27 01/19/2024    BUN 15 01/19/2024    CREATININE 1.1 01/19/2024    CREATININE 1.1 01/19/2024    ANIONGAP 8 01/19/2024     No results found for: "HGBA1C"  No results found for: "BNP", "BNPTRIAGEBLO"    Lab Results   Component Value Date    WBC 6.27 01/19/2024    HGB 16.6 01/19/2024    HCT 49.2 01/19/2024     01/19/2024    GRAN 3.3 01/27/2020    GRAN 63.4 01/27/2020     No results found for: "CHOL", "HDL", "LDLCALC", "TRIG"    Lab Results   Component Value Date     01/19/2024    K 4.5 01/19/2024     01/19/2024    CO2 27 01/19/2024    BUN 15 01/19/2024    CREATININE 1.1 01/19/2024    CREATININE 1.1 01/19/2024    ANIONGAP 8 01/19/2024     No results found for: "HGBA1C"  No results found for: "BNP", "BNPTRIAGEBLO" Lab Results   Component Value Date    WBC 6.27 01/19/2024    HGB 16.6 01/19/2024    HCT 49.2 01/19/2024     01/19/2024    GRAN 3.3 01/27/2020    GRAN 63.4 01/27/2020     No results found for: "CHOL", "HDL", " ""LDLCALC", "TRIG"             Cardiovascular Imaging:       CTA head and neck   Impression:     No evidence of recent or remote major vascular distribution infarct.     Extensive noncalcified atherosclerotic plaque throughout the aortic arch and carotid circulation.  There is a severe stenosis (greater than 90%) at the left ICA origin with resultant adaptive narrowing distally.  Additional 60-70% stenosis at the right ICA origin.     Vascular surgery consultation is advised.     Additional incidental findings as above.       Assessment & Plan:   Carotid artery stenosis  L ICA on CTA > 90% stenosis, R ICA reported 60-70% stenosis, patient has dizziness when he looks up but no know hx of CVA  Proceed with cerebral angiogram and +/- stenting of the L ICA  Access RCFA   Antiplatelet Aspirin and Plavix   Cr 1.1  Risks and benefits were discussed with patient, all questions were answered           Signed:  Tony Stevenson MD  Interventional fellow      "

## 2024-03-27 NOTE — ASSESSMENT & PLAN NOTE
L ICA on CTA > 90% stenosis, R ICA reported 60-70% stenosis, patient has dizziness when he looks up but no know hx of CVA  Proceed with cerebral angiogram and +/- stenting of the L ICA  Access RCFA   Antiplatelet Aspirin and Plavix   Cr 1.1  Risks and benefits were discussed with patient, all questions were answered

## 2024-03-27 NOTE — PROGRESS NOTES
OUTPATIENT CATHETERIZATION INSTRUCTIONS    If you should have any questions, concerns, or need to change the date of your procedure, please call  YULISA Santamaria @ [phone]      You have been scheduled for a procedure in the catheterization lab on Thursday, June 6, 2024.     Please report to the Cardiology Waiting Area on the Third floor of the hospital and check in at 8:00AM.  You will then be taken to the SSCU (Short Stay Cardiac Unit) and prepared for your procedure. Please be aware that this is not the time of your procedure but the time you are to arrive. The procedures are scheduled on an hourly basis; however, emergency cases take precedence over all other cases.       Nothing to eat or drink after MIDNIGHT 12 AM You may have clear liquids until the time of your admission which should be 2 hours prior to your procedure.          You are encouraged to drink at least 8 ounces of clear liquids prior to your admission to SSCU.          Clear liquids include water, black coffee, clear juices, and performance drinks - no pulp or milk.         .    2.   You may take your regular morning medications with water.       3.   Hold the following medications prior to your procedure: NONE                   4.   Start taking  Plavix 75mg, one tablet daily starting today.  Take on day of procedure.  Do not stop.        The procedure will take 1-2 hours to perform. During the procedure you will receive IV sedation administered by a nurse.  Most patients will sleep through procedure.  After the procedure, you will either return to SSCU or spend some time in the cath lab recovery room.  If appropriate, your family will be able to stay with you during this time.      You should be discharged home that same day if you are stable and there are no complications.  Your doctor will determine whether you are discharged or kept overnight  based on your particular procedure and time that procedure is completed.   The results of your  procedure will be discussed with you before you are discharged.     YOU WILL NOT BE ABLE TO DRIVE YOURSELF HOME.  SOMEONE MUST BE ABLE TO DRIVE YOU HOME FROM HOSPITAL.                       INSTRUCTIONS WERE GIVEN TO THE PATIENT VERBALLY AND THEY VERBALIZED UNDERSTANDING.  THEY DO NOT REQUIRE ANY SPECIAL NEEDS AND DO NOT HAVE ANY LEARNING BARRIERS.      Directions for Reporting to Cardiology Waiting Area in the Hospital  If you park in the Parking Garage:  Take elevators to the1st floor of the parking garage.  Continue past the gift shop, coffee shop, and piano.  Take a right and go to the gold elevators. (Elevator B)  Take the elevator to the 3rd floor.  Follow the arrow on the sign on the wall that says Cath Lab Registration/EP Lab Registration.  Follow the long hallway all the way around until you come to a big open area.  This is the registration area.  Check in at Reception Desk.    OR    If family is dropping you off:  Have them drop you off at the front of the Hospital under the green overhang.  Enter through the doors and take a right.  Take the E elevators to the 3rd floor Cardiology Waiting Area.  Check in at the Reception Desk in the waiting room.

## 2024-05-30 ENCOUNTER — LAB VISIT (OUTPATIENT)
Dept: LAB | Facility: HOSPITAL | Age: 54
End: 2024-05-30
Attending: INTERNAL MEDICINE
Payer: MEDICAID

## 2024-05-30 DIAGNOSIS — Z01.818 PREOP TESTING: ICD-10-CM

## 2024-05-30 LAB
ANION GAP SERPL CALC-SCNC: 10 MMOL/L (ref 8–16)
BUN SERPL-MCNC: 8 MG/DL (ref 6–20)
CALCIUM SERPL-MCNC: 9.4 MG/DL (ref 8.7–10.5)
CHLORIDE SERPL-SCNC: 106 MMOL/L (ref 95–110)
CO2 SERPL-SCNC: 26 MMOL/L (ref 23–29)
CREAT SERPL-MCNC: 1.1 MG/DL (ref 0.5–1.4)
ERYTHROCYTE [DISTWIDTH] IN BLOOD BY AUTOMATED COUNT: 12.7 % (ref 11.5–14.5)
EST. GFR  (NO RACE VARIABLE): >60 ML/MIN/1.73 M^2
GLUCOSE SERPL-MCNC: 106 MG/DL (ref 70–110)
HCT VFR BLD AUTO: 43.3 % (ref 40–54)
HGB BLD-MCNC: 14.5 G/DL (ref 14–18)
MCH RBC QN AUTO: 29.9 PG (ref 27–31)
MCHC RBC AUTO-ENTMCNC: 33.5 G/DL (ref 32–36)
MCV RBC AUTO: 89 FL (ref 82–98)
PLATELET # BLD AUTO: 131 K/UL (ref 150–450)
PMV BLD AUTO: 10.9 FL (ref 9.2–12.9)
POTASSIUM SERPL-SCNC: 3.6 MMOL/L (ref 3.5–5.1)
RBC # BLD AUTO: 4.85 M/UL (ref 4.6–6.2)
SODIUM SERPL-SCNC: 142 MMOL/L (ref 136–145)
WBC # BLD AUTO: 6.28 K/UL (ref 3.9–12.7)

## 2024-05-30 PROCEDURE — 80048 BASIC METABOLIC PNL TOTAL CA: CPT | Performed by: INTERNAL MEDICINE

## 2024-05-30 PROCEDURE — 36415 COLL VENOUS BLD VENIPUNCTURE: CPT | Performed by: INTERNAL MEDICINE

## 2024-05-30 PROCEDURE — 85027 COMPLETE CBC AUTOMATED: CPT | Performed by: INTERNAL MEDICINE

## 2024-06-06 ENCOUNTER — HOSPITAL ENCOUNTER (INPATIENT)
Facility: HOSPITAL | Age: 54
LOS: 1 days | Discharge: HOME | DRG: 254 | End: 2024-06-06
Attending: INTERNAL MEDICINE | Admitting: INTERNAL MEDICINE
Payer: MEDICAID

## 2024-06-06 VITALS
BODY MASS INDEX: 29.7 KG/M2 | HEART RATE: 76 BPM | TEMPERATURE: 99 F | SYSTOLIC BLOOD PRESSURE: 127 MMHG | OXYGEN SATURATION: 97 % | RESPIRATION RATE: 18 BRPM | WEIGHT: 196 LBS | DIASTOLIC BLOOD PRESSURE: 63 MMHG | HEIGHT: 68 IN

## 2024-06-06 DIAGNOSIS — I65.23 BILATERAL CAROTID ARTERY STENOSIS: Primary | ICD-10-CM

## 2024-06-06 DIAGNOSIS — Z01.812 PRE-PROCEDURE LAB EXAM: ICD-10-CM

## 2024-06-06 DIAGNOSIS — I65.22 CAROTID STENOSIS, SYMPTOMATIC W/O INFARCT, LEFT: ICD-10-CM

## 2024-06-06 LAB
ABO + RH BLD: NORMAL
BLD GP AB SCN CELLS X3 SERPL QL: NORMAL
OHS QRS DURATION: 80 MS
OHS QTC CALCULATION: 448 MS

## 2024-06-06 PROCEDURE — 36415 COLL VENOUS BLD VENIPUNCTURE: CPT | Performed by: INTERNAL MEDICINE

## 2024-06-06 PROCEDURE — B31G1ZZ FLUOROSCOPY OF BILATERAL VERTEBRAL ARTERIES USING LOW OSMOLAR CONTRAST: ICD-10-PCS | Performed by: INTERNAL MEDICINE

## 2024-06-06 PROCEDURE — 36223 PLACE CATH CAROTID/INOM ART: CPT | Mod: 50 | Performed by: INTERNAL MEDICINE

## 2024-06-06 PROCEDURE — 25000003 PHARM REV CODE 250: Performed by: STUDENT IN AN ORGANIZED HEALTH CARE EDUCATION/TRAINING PROGRAM

## 2024-06-06 PROCEDURE — C1887 CATHETER, GUIDING: HCPCS | Performed by: INTERNAL MEDICINE

## 2024-06-06 PROCEDURE — 25000003 PHARM REV CODE 250: Performed by: INTERNAL MEDICINE

## 2024-06-06 PROCEDURE — 86901 BLOOD TYPING SEROLOGIC RH(D): CPT | Performed by: INTERNAL MEDICINE

## 2024-06-06 PROCEDURE — 25500020 PHARM REV CODE 255: Performed by: INTERNAL MEDICINE

## 2024-06-06 PROCEDURE — C1894 INTRO/SHEATH, NON-LASER: HCPCS | Performed by: INTERNAL MEDICINE

## 2024-06-06 PROCEDURE — 27201423 OPTIME MED/SURG SUP & DEVICES STERILE SUPPLY: Performed by: INTERNAL MEDICINE

## 2024-06-06 PROCEDURE — 37220 HC ILIAC REVASC: CPT | Mod: RT | Performed by: INTERNAL MEDICINE

## 2024-06-06 PROCEDURE — B3181ZZ FLUOROSCOPY OF BILATERAL INTERNAL CAROTID ARTERIES USING LOW OSMOLAR CONTRAST: ICD-10-PCS | Performed by: INTERNAL MEDICINE

## 2024-06-06 PROCEDURE — 11000001 HC ACUTE MED/SURG PRIVATE ROOM

## 2024-06-06 PROCEDURE — 047C3ZZ DILATION OF RIGHT COMMON ILIAC ARTERY, PERCUTANEOUS APPROACH: ICD-10-PCS | Performed by: INTERNAL MEDICINE

## 2024-06-06 PROCEDURE — B4101ZZ FLUOROSCOPY OF ABDOMINAL AORTA USING LOW OSMOLAR CONTRAST: ICD-10-PCS | Performed by: INTERNAL MEDICINE

## 2024-06-06 PROCEDURE — 93005 ELECTROCARDIOGRAM TRACING: CPT

## 2024-06-06 PROCEDURE — C1725 CATH, TRANSLUMIN NON-LASER: HCPCS | Performed by: INTERNAL MEDICINE

## 2024-06-06 PROCEDURE — 93010 ELECTROCARDIOGRAM REPORT: CPT | Mod: ,,, | Performed by: INTERNAL MEDICINE

## 2024-06-06 PROCEDURE — C1769 GUIDE WIRE: HCPCS | Performed by: INTERNAL MEDICINE

## 2024-06-06 PROCEDURE — 99152 MOD SED SAME PHYS/QHP 5/>YRS: CPT | Performed by: INTERNAL MEDICINE

## 2024-06-06 PROCEDURE — 36226 PLACE CATH VERTEBRAL ART: CPT | Mod: 50 | Performed by: INTERNAL MEDICINE

## 2024-06-06 PROCEDURE — 63600175 PHARM REV CODE 636 W HCPCS: Performed by: INTERNAL MEDICINE

## 2024-06-06 PROCEDURE — 75625 CONTRAST EXAM ABDOMINL AORTA: CPT | Performed by: INTERNAL MEDICINE

## 2024-06-06 PROCEDURE — 99153 MOD SED SAME PHYS/QHP EA: CPT | Performed by: INTERNAL MEDICINE

## 2024-06-06 RX ORDER — HEPARIN SODIUM 1000 [USP'U]/ML
INJECTION, SOLUTION INTRAVENOUS; SUBCUTANEOUS
Status: DISCONTINUED | OUTPATIENT
Start: 2024-06-06 | End: 2024-06-06 | Stop reason: HOSPADM

## 2024-06-06 RX ORDER — HEPARIN SOD,PORCINE/0.9 % NACL 1000/500ML
INTRAVENOUS SOLUTION INTRAVENOUS
Status: DISCONTINUED | OUTPATIENT
Start: 2024-06-06 | End: 2024-06-06 | Stop reason: HOSPADM

## 2024-06-06 RX ORDER — LIDOCAINE HYDROCHLORIDE 20 MG/ML
INJECTION, SOLUTION EPIDURAL; INFILTRATION; INTRACAUDAL; PERINEURAL
Status: DISCONTINUED | OUTPATIENT
Start: 2024-06-06 | End: 2024-06-06 | Stop reason: HOSPADM

## 2024-06-06 RX ORDER — MIDAZOLAM HYDROCHLORIDE 1 MG/ML
INJECTION, SOLUTION INTRAMUSCULAR; INTRAVENOUS
Status: DISCONTINUED | OUTPATIENT
Start: 2024-06-06 | End: 2024-06-06 | Stop reason: HOSPADM

## 2024-06-06 RX ORDER — ASPIRIN 81 MG/1
81 TABLET ORAL DAILY
Qty: 90 TABLET | Refills: 3 | Status: SHIPPED | OUTPATIENT
Start: 2024-06-06 | End: 2025-06-06

## 2024-06-06 RX ORDER — PROTAMINE SULFATE 10 MG/ML
INJECTION, SOLUTION INTRAVENOUS
Status: DISCONTINUED | OUTPATIENT
Start: 2024-06-06 | End: 2024-06-06 | Stop reason: HOSPADM

## 2024-06-06 RX ORDER — CEFAZOLIN SODIUM 1 G/3ML
INJECTION, POWDER, FOR SOLUTION INTRAMUSCULAR; INTRAVENOUS
Status: DISCONTINUED | OUTPATIENT
Start: 2024-06-06 | End: 2024-06-06 | Stop reason: HOSPADM

## 2024-06-06 RX ORDER — ASPIRIN 325 MG
325 TABLET ORAL ONCE
Status: COMPLETED | OUTPATIENT
Start: 2024-06-06 | End: 2024-06-06

## 2024-06-06 RX ORDER — DIPHENHYDRAMINE HCL 50 MG
50 CAPSULE ORAL ONCE
Status: COMPLETED | OUTPATIENT
Start: 2024-06-06 | End: 2024-06-06

## 2024-06-06 RX ORDER — SODIUM CHLORIDE 9 MG/ML
INJECTION, SOLUTION INTRAVENOUS CONTINUOUS
Status: ACTIVE | OUTPATIENT
Start: 2024-06-06 | End: 2024-06-06

## 2024-06-06 RX ORDER — CLOPIDOGREL BISULFATE 300 MG/1
600 TABLET, FILM COATED ORAL ONCE
Status: COMPLETED | OUTPATIENT
Start: 2024-06-06 | End: 2024-06-06

## 2024-06-06 RX ORDER — FENTANYL CITRATE 50 UG/ML
INJECTION, SOLUTION INTRAMUSCULAR; INTRAVENOUS
Status: DISCONTINUED | OUTPATIENT
Start: 2024-06-06 | End: 2024-06-06 | Stop reason: HOSPADM

## 2024-06-06 RX ADMIN — ASPIRIN 325 MG ORAL TABLET 325 MG: 325 PILL ORAL at 09:06

## 2024-06-06 RX ADMIN — DIPHENHYDRAMINE HYDROCHLORIDE 50 MG: 50 CAPSULE ORAL at 09:06

## 2024-06-06 RX ADMIN — SODIUM CHLORIDE: 9 INJECTION, SOLUTION INTRAVENOUS at 09:06

## 2024-06-06 RX ADMIN — CLOPIDOGREL BISULFATE 600 MG: 300 TABLET, FILM COATED ORAL at 09:06

## 2024-06-06 NOTE — HPI
Patient ID:  Derian Cartwright is a 54 to male. Presented with complaint of falling and weakness in his upper extremities. He reports sudden loss in strength in lower extremities and falling, usually not accompanied with dizziness or loss of conscious, also he reports feeling dizzy and falling when he looks up, reporting dropping things from his hands and weakness in the hands with pain     We did a cervical spine MRI patient is following up with Spine. CTA of the head and neck showed there is a severe stenosis (greater than 90%) at the left ICA origin with resultant adaptive narrowing distally. Additional 60-70% stenosis at the right ICA origin. Plan for cerebral angiogram and +/- stenting of the L ICA

## 2024-06-06 NOTE — Clinical Note
The catheter was reinserted into the abdominal aorta. An angiography was performed of the iliac arteries.  And was removed.

## 2024-06-06 NOTE — Clinical Note
140 ml of contrast were injected throughout the case. 160 mL of contrast was the total wasted during the case. 300 mL was the total amount used during the case.

## 2024-06-06 NOTE — NURSING
Pt supposed to walk at 6pm. Pt is demanding to leave at this time due to ride only being available now to  pt. MD with #[id number] notified. MD will contact me back about if pt can walk early and leave. Pt made aware to remain in the bed and not to get up or get dressed until getting the ok from the physician.

## 2024-06-06 NOTE — NURSING
MD Escobedo at bedside speaking with pt about leaving after 4 hrs of bed rest instead of 6. MD okayed pt leave early due to his options for rides home being limited. Pt able to drink, eat, walk, and used restroom without issues. Pt and pt's mother given discharge paperwork and education reiterated. All questions and concerns addressed. Pt's chang groin sites have gauze/tegaderm intact and sites are free from s/s of bleeding. IV and tele removed. Pt waiting to wheel out.

## 2024-06-06 NOTE — PLAN OF CARE
Received pt to floor from home accompanied by mother.  AAO x 4. Denies pain or discomfort. Respirations even and unlabored. No distress noted. Pt stable.  Admit assessment complete. IV x 2 placed.  Pt oriented to room and call bell placed within reach.  Will continue to monitor.

## 2024-06-06 NOTE — PLAN OF CARE
Received report from YULISA Alvarez cath lab. Pt is s/p angiogram Aortic Arch and 4 vessel bilateral and PTA illiac Artery angiogram. Pt is aaox4. Vss. Resp even and unlabored. Bilateral groin with gauze/transparent dressings c/d/I. No active bleeding. No hematoma noted. Post procedure protocol reviewed with patient. Mother at bedside. Nurse call bell within reach. Will monitor

## 2024-06-06 NOTE — Clinical Note
An angiography was performed of the aorta. The angiography was performed via power injection. The injected amount was 20 mL contrast at 10 mL/s. The PSI from the power injection was 1000.

## 2024-06-06 NOTE — PROCEDURES
Brief Operative Note:    : Fuentes Butts MD     Referring Physician: Fuentes Butts     All Operators: Surgeon(s):  Fuentes Butts, Satya Joseph MD Davis, MD Elva Shetty, MD Tony     Preoperative Diagnosis: Carotid stenosis, symptomatic w/o infarct, left [I65.22]     Postop Diagnosis: Carotid stenosis, symptomatic w/o infarct, left [I65.22]    Treatments/Procedures: Procedure(s) (LRB):  Angiogram, Aortic Arch And 4 Vessel (Bilateral)  Stent, Carotid W/ Protect (Left)    Access: Right CFA, Left CFA    Findings:    Total occlusion of right common iliac artery.     Stenosis of ostial right vertebral artery.     Stenosis of  right common iliac artery to internal iliac artery.     Total occlusion of left internal iliac artery.     See catheterization report for full details.    Intervention:     Balloon angioplasty of right common iliac artery     See catheterization report for full details.    Closure device: Manual pressure     Plan:  - Post cath protocol   - IVF @ 150 cc/kg/hr x 4 hours  - Bed rest x 6 hours   -Continue Plavix 75 mg daily   - Start aspirin 81 mg daily indefinitely  - Continue high intensity statin therapy (LDL goal < 70)  - Risk factor reduction (BP <130/80 mmHg, glycemic control, etc)  - Follow up with outpatient cardiologist    Estimated Blood loss: 20 cc    Specimens removed: No    Satya Escobedo MD  Interventional Cardiology PGY-6

## 2024-06-06 NOTE — H&P
[facility] - Short Stay Cardiac Unit  Interventional Cardiology  H&P    Patient Name: Derian Cartwright  MRN: [id number]  Admission Date: 6/6/2024  Code Status: No Order   Attending Provider: Fuentes Butts MD   Primary Care Physician: Garrett Singh III, MD  Principal Problem:<principal problem not specified>    Patient information was obtained from patient and ER records.     Subjective:       HPI:  Patient ID:  Derian Cartwright is a 54 to male. Presented with complaint of falling and weakness in his upper extremities. He reports sudden loss in strength in lower extremities and falling, usually not accompanied with dizziness or loss of conscious, also he reports feeling dizzy and falling when he looks up, reporting dropping things from his hands and weakness in the hands with pain     We did a cervical spine MRI patient is following up with Spine. CTA of the head and neck showed there is a severe stenosis (greater than 90%) at the left ICA origin with resultant adaptive narrowing distally. Additional 60-70% stenosis at the right ICA origin. Plan for cerebral angiogram and +/- stenting of the L ICA      Past Medical History:   Diagnosis Date    Anxiety     Depression     Emphysema of lung     Peripheral neuropathy        No past surgical history on file.    Review of patient's allergies indicates:  No Known Allergies    Facility-Administered Medications Prior to Admission   Medication    sodium chloride 0.9% flush 10 mL     PTA Medications   Medication Sig    atorvastatin calcium (ATORVASTATIN ORAL) Take by mouth.    clopidogreL (PLAVIX) 75 mg tablet Take 1 tablet (75 mg total) by mouth once daily. Please take 4 tablets ( 300 mg ) first dose, then after that one tablet daily ( 75 mg daily )    famotidine (PEPCID) 20 MG tablet Take 1 tablet (20 mg total) by mouth 2 (two) times daily.    ibuprofen (ADVIL,MOTRIN) 800 MG tablet Take 1 tablet (800 mg total) by mouth every 6 (six) hours as needed for  Pain.    pregabalin (LYRICA) 75 MG capsule Take 75 mg by mouth 2 (two) times daily.    quetiapine (SEROQUEL XR) 300 MG Tb24 Take by mouth once daily.    sertraline (ZOLOFT) 100 MG tablet Take 100 mg by mouth once daily.    simethicone (MYLICON) 125 mg Cap capsule Take 1 capsule (125 mg total) by mouth 4 (four) times daily as needed. (Patient not taking: Reported on 1/10/2024)     Family History    None       Tobacco Use    Smoking status: Some Days     Current packs/day: 0.50     Types: Cigarettes    Smokeless tobacco: Not on file    Tobacco comments:     jewel pods   Substance and Sexual Activity    Alcohol use: Not Currently    Drug use: Yes     Types: Marijuana     Comment: occasional    Sexual activity: Not on file     Review of Systems   Constitutional: Negative for fever and malaise/fatigue.   Eyes:  Negative for blurred vision and pain.   Cardiovascular:  Negative for chest pain, dyspnea on exertion, leg swelling, orthopnea, palpitations and paroxysmal nocturnal dyspnea.   Respiratory:  Negative for cough, shortness of breath, sputum production and wheezing.    Hematologic/Lymphatic: Negative for adenopathy and bleeding problem.   Skin:  Negative for rash.   Musculoskeletal:  Negative for back pain and neck pain.   Gastrointestinal:  Negative for abdominal pain, constipation, diarrhea, nausea and vomiting.   Genitourinary:  Negative for dysuria.   Neurological:  Negative for dizziness, headaches, light-headedness and weakness.     Objective:     Vital Signs (Most Recent):    Vital Signs (24h Range):           There is no height or weight on file to calculate BMI.            No intake or output data in the 24 hours ending 06/06/24 0817    Lines/Drains/Airways       None                    Physical Exam  Constitutional:       General: He is not in acute distress.     Appearance: Normal appearance. He is not ill-appearing, toxic-appearing or diaphoretic.   HENT:      Head: Normocephalic and atraumatic.       "Nose: Nose normal.   Eyes:      Extraocular Movements: Extraocular movements intact.      Pupils: Pupils are equal, round, and reactive to light.   Cardiovascular:      Rate and Rhythm: Normal rate and regular rhythm.      Heart sounds: No murmur heard.     No friction rub. No gallop.   Pulmonary:      Effort: Pulmonary effort is normal. No respiratory distress.      Breath sounds: Normal breath sounds. No wheezing or rales.   Abdominal:      General: Abdomen is flat. There is no distension.      Palpations: Abdomen is soft. There is no mass.      Tenderness: There is no abdominal tenderness.   Musculoskeletal:         General: No swelling. Normal range of motion.      Cervical back: Normal range of motion. No rigidity.      Right lower leg: No edema.      Left lower leg: No edema.   Skin:     General: Skin is warm and dry.      Coloration: Skin is not jaundiced.      Findings: No bruising.   Neurological:      General: No focal deficit present.      Mental Status: He is alert and oriented to person, place, and time.      Cranial Nerves: No cranial nerve deficit.      Motor: No weakness.            Significant Labs: BMP: No results for input(s): "GLU", "NA", "K", "CL", "CO2", "BUN", "CREATININE", "CALCIUM", "MG" in the last 48 hours. and CBC No results for input(s): "WBC", "HGB", "HCT", "PLT" in the last 48 hours.    Significant Imaging: Reviewed   Assessment and Plan:     Cardiac/Vascular  Carotid artery stenosis  L ICA on CTA > 90% stenosis, R ICA reported 60-70% stenosis, patient has dizziness when he looks up but no know hx of CVA  Proceed with cerebral angiogram and +/- stenting of the L ICA  Access RCFA   Antiplatelet Aspirin and Plavix   Cr 1.1  Risks and benefits were discussed with patient, all questions were answered         VTE Risk Mitigation (From admission, onward)      None            Satya Escobedo MD  Interventional Cardiology   [facility] - Short Stay Cardiac Unit      "

## 2024-06-06 NOTE — SUBJECTIVE & OBJECTIVE
Past Medical History:   Diagnosis Date    Anxiety     Depression     Emphysema of lung     Peripheral neuropathy        No past surgical history on file.    Review of patient's allergies indicates:  No Known Allergies    Facility-Administered Medications Prior to Admission   Medication    sodium chloride 0.9% flush 10 mL     PTA Medications   Medication Sig    atorvastatin calcium (ATORVASTATIN ORAL) Take by mouth.    clopidogreL (PLAVIX) 75 mg tablet Take 1 tablet (75 mg total) by mouth once daily. Please take 4 tablets ( 300 mg ) first dose, then after that one tablet daily ( 75 mg daily )    famotidine (PEPCID) 20 MG tablet Take 1 tablet (20 mg total) by mouth 2 (two) times daily.    ibuprofen (ADVIL,MOTRIN) 800 MG tablet Take 1 tablet (800 mg total) by mouth every 6 (six) hours as needed for Pain.    pregabalin (LYRICA) 75 MG capsule Take 75 mg by mouth 2 (two) times daily.    quetiapine (SEROQUEL XR) 300 MG Tb24 Take by mouth once daily.    sertraline (ZOLOFT) 100 MG tablet Take 100 mg by mouth once daily.    simethicone (MYLICON) 125 mg Cap capsule Take 1 capsule (125 mg total) by mouth 4 (four) times daily as needed. (Patient not taking: Reported on 1/10/2024)     Family History    None       Tobacco Use    Smoking status: Some Days     Current packs/day: 0.50     Types: Cigarettes    Smokeless tobacco: Not on file    Tobacco comments:     jewel pods   Substance and Sexual Activity    Alcohol use: Not Currently    Drug use: Yes     Types: Marijuana     Comment: occasional    Sexual activity: Not on file     Review of Systems   Constitutional: Negative for fever and malaise/fatigue.   Eyes:  Negative for blurred vision and pain.   Cardiovascular:  Negative for chest pain, dyspnea on exertion, leg swelling, orthopnea, palpitations and paroxysmal nocturnal dyspnea.   Respiratory:  Negative for cough, shortness of breath, sputum production and wheezing.    Hematologic/Lymphatic: Negative for adenopathy and  bleeding problem.   Skin:  Negative for rash.   Musculoskeletal:  Negative for back pain and neck pain.   Gastrointestinal:  Negative for abdominal pain, constipation, diarrhea, nausea and vomiting.   Genitourinary:  Negative for dysuria.   Neurological:  Negative for dizziness, headaches, light-headedness and weakness.     Objective:     Vital Signs (Most Recent):    Vital Signs (24h Range):           There is no height or weight on file to calculate BMI.            No intake or output data in the 24 hours ending 06/06/24 0817    Lines/Drains/Airways       None                    Physical Exam  Constitutional:       General: He is not in acute distress.     Appearance: Normal appearance. He is not ill-appearing, toxic-appearing or diaphoretic.   HENT:      Head: Normocephalic and atraumatic.      Nose: Nose normal.   Eyes:      Extraocular Movements: Extraocular movements intact.      Pupils: Pupils are equal, round, and reactive to light.   Cardiovascular:      Rate and Rhythm: Normal rate and regular rhythm.      Heart sounds: No murmur heard.     No friction rub. No gallop.   Pulmonary:      Effort: Pulmonary effort is normal. No respiratory distress.      Breath sounds: Normal breath sounds. No wheezing or rales.   Abdominal:      General: Abdomen is flat. There is no distension.      Palpations: Abdomen is soft. There is no mass.      Tenderness: There is no abdominal tenderness.   Musculoskeletal:         General: No swelling. Normal range of motion.      Cervical back: Normal range of motion. No rigidity.      Right lower leg: No edema.      Left lower leg: No edema.   Skin:     General: Skin is warm and dry.      Coloration: Skin is not jaundiced.      Findings: No bruising.   Neurological:      General: No focal deficit present.      Mental Status: He is alert and oriented to person, place, and time.      Cranial Nerves: No cranial nerve deficit.      Motor: No weakness.            Significant Labs: BMP:  "No results for input(s): "GLU", "NA", "K", "CL", "CO2", "BUN", "CREATININE", "CALCIUM", "MG" in the last 48 hours. and CBC No results for input(s): "WBC", "HGB", "HCT", "PLT" in the last 48 hours.    Significant Imaging: Reviewed   "

## 2024-06-06 NOTE — DISCHARGE SUMMARY
Discharge Summary  Interventional Cardiology      Admit Date: 6/6/2024    Discharge Date:  6/6/2024    Attending Physician: Fuentes Butts MD    Discharge Physician: Fuentes Butts MD    Principal Diagnoses: <principal problem not specified>  Indication for Admission: Angiogram, Aortic Arch And 4 Vessel (Bilateral), PTA, Iliac Artery, ANGIOGRAM, ABDOMINAL AORTA    Discharged Condition: Good    Hospital Course:   Patient presented for outpatient  aortic arch angiogram with 4 vessel runoff and cerebral angiogram  which went without complication.     Pt had total occlusion of right common iliac artery, stenosis of ostial right vertebral artery, Stenosis of right common iliac artery to internal iliac artery, total occlusion of left internal iliac artery. See catheterization report for full details.Pt had balloon angioplasty of right common iliac artery. See full cath report in Epic for details. Hemostasis of patient's R CFA access site was achieved with manual pressure. Patient was monitored per post-cath protocol, and his R groin access site was c/d/i with no hematoma. Patient was able to ambulate without difficulty. He was feeling well and anticipating discharge home today.     Outpatient Plan:  - Medication changes/ additions include: Starting ASA 81 mg daily     Diet: Cardiac diet    Activity: Ad walker, wound care instructions provided    Disposition: Home or Self Care      Discharge Medications:      Medication List        START taking these medications      aspirin 81 MG EC tablet  Commonly known as: ECOTRIN  Take 1 tablet (81 mg total) by mouth once daily.            CONTINUE taking these medications      ATORVASTATIN ORAL     clopidogreL 75 mg tablet  Commonly known as: PLAVIX  Take 1 tablet (75 mg total) by mouth once daily. Please take 4 tablets ( 300 mg ) first dose, then after that one tablet daily ( 75 mg daily )     famotidine 20 MG tablet  Commonly known as: PEPCID  Take 1 tablet (20 mg total)  by mouth 2 (two) times daily.     ibuprofen 800 MG tablet  Commonly known as: ADVIL,MOTRIN  Take 1 tablet (800 mg total) by mouth every 6 (six) hours as needed for Pain.     pregabalin 75 MG capsule  Commonly known as: LYRICA     QUEtiapine 300 MG Tb24  Commonly known as: SEROQUEL XR     sertraline 100 MG tablet  Commonly known as: ZOLOFT            ASK your doctor about these medications      simethicone 125 mg Cap capsule  Commonly known as: MYLICON  Take 1 capsule (125 mg total) by mouth 4 (four) times daily as needed.               Where to Get Your Medications        These medications were sent to [id number] [place] [address] [address]      Phone: [phone]   aspirin 81 MG EC tablet         Satya Escobedo  Cardiology Fellow PGY-6

## 2024-06-06 NOTE — NURSING
Report received from S[facility], YULISA Ma at bedside. Pt's vs taken and wnl. Pt is free from s/s of distress. Pt's chang groin sites are intact and sites are free from s/s of bleeding. Safety measures in place. Pt understands when to call for assistance.

## 2024-06-10 LAB
POC ACTIVATED CLOTTING TIME K: 256 SEC (ref 74–137)
POC ACTIVATED CLOTTING TIME K: 275 SEC (ref 74–137)
SAMPLE: ABNORMAL
SAMPLE: ABNORMAL

## (undated) DEVICE — TRANSDUCER ADULT DISP

## (undated) DEVICE — GUIDEWIRE STF .035X180CM ANG

## (undated) DEVICE — KIT MICROINTRO 4F .018X40X7CM

## (undated) DEVICE — GUIDEWIRE ADVNTG 018X180CM ANG

## (undated) DEVICE — VISE RADIFOCUS MULTI TORQUE

## (undated) DEVICE — KIT CUSTOM MANIFOLD

## (undated) DEVICE — CATH DXTERITY IMA 100CM 6FR

## (undated) DEVICE — PAD DEFIB CADENCE ADULT R2

## (undated) DEVICE — GUIDEWIRE STF .035X180CM STR

## (undated) DEVICE — SHEATH INTRODUCER 6FR 11CM

## (undated) DEVICE — OMNIPAQUE 350MG 150ML VIAL

## (undated) DEVICE — CATH BERNSTAN 5FR

## (undated) DEVICE — CATH SEEKER .035IN 90CM

## (undated) DEVICE — CATH ULTRAVERSE 035 7X40X130

## (undated) DEVICE — CATH SUPER TORQUE MP 4FRX80CM

## (undated) DEVICE — CATH STERLING MR 7X40X135

## (undated) DEVICE — CATH ANG PIGTAIL 4FR INFINITY

## (undated) DEVICE — GUIDEWIRE SUPRA CORE 035 190CM

## (undated) DEVICE — COVER INSTR ELASTIC BAND 40X20

## (undated) DEVICE — KIT GLIDESHEATH SLEND 6FR 10CM

## (undated) DEVICE — TRAY CATH LAB OMC